# Patient Record
Sex: MALE
[De-identification: names, ages, dates, MRNs, and addresses within clinical notes are randomized per-mention and may not be internally consistent; named-entity substitution may affect disease eponyms.]

---

## 2018-08-20 ENCOUNTER — RX RENEWAL (OUTPATIENT)
Age: 43
End: 2018-08-20

## 2018-08-20 PROBLEM — Z00.00 ENCOUNTER FOR PREVENTIVE HEALTH EXAMINATION: Status: ACTIVE | Noted: 2018-08-20

## 2018-11-12 ENCOUNTER — APPOINTMENT (OUTPATIENT)
Dept: ENDOCRINOLOGY | Facility: CLINIC | Age: 43
End: 2018-11-12

## 2023-05-10 ENCOUNTER — APPOINTMENT (OUTPATIENT)
Dept: DERMATOLOGY | Facility: CLINIC | Age: 48
End: 2023-05-10
Payer: MEDICAID

## 2023-05-10 DIAGNOSIS — L82.1 OTHER SEBORRHEIC KERATOSIS: ICD-10-CM

## 2023-05-10 DIAGNOSIS — D22.5 MELANOCYTIC NEVI OF TRUNK: ICD-10-CM

## 2023-05-10 DIAGNOSIS — L73.8 OTHER SPECIFIED FOLLICULAR DISORDERS: ICD-10-CM

## 2023-05-10 PROCEDURE — 99202 OFFICE O/P NEW SF 15 MIN: CPT

## 2023-05-10 RX ORDER — ATORVASTATIN CALCIUM 80 MG/1
TABLET, FILM COATED ORAL
Refills: 0 | Status: ACTIVE | COMMUNITY

## 2023-05-10 RX ORDER — RAMIPRIL 5 MG/1
CAPSULE ORAL
Refills: 0 | Status: ACTIVE | COMMUNITY

## 2023-05-10 NOTE — HISTORY OF PRESENT ILLNESS
[FreeTextEntry1] : Evaluation of growths [de-identified] : First visit for 47-year-old white male presenting for evaluation of growths.  Particularly concerned about a tender lesion in the right anterior temporal scalp area.\par \par No history of skin cancer.

## 2023-05-10 NOTE — ASSESSMENT
[FreeTextEntry1] : Seborrheic keratosis with concomitant sebaceous hyperplasia on right anterior temporal scalp\par Melanocytic nevi and seborrheic keratoses on trunk\par Traumatized skin tag on left neck

## 2023-05-10 NOTE — PHYSICAL EXAM
[Alert] : alert [Oriented x 3] : ~L oriented x 3 [Well Nourished] : well nourished [FreeTextEntry3] : The following areas were examined and no significant abnormalities were seen except as noted below:\par \par Type II skin\par \par scalp, face, eyelids, nose, lips, ears, neck, chest, abdomen, back,groin, buttocks, right arm, left arm, right hand, left hand,\par right  leg, left leg, right foot, left foot\par \par Right anterior temporal scalp: 17 x 12 mm tan verrucous plaque with a 3 x 3 mm yellow umbilicated papule present at 2:00\par Left neck: 3 x 3 mm protuberant pink excrescence with central crust\par Trunk: Mild to moderate small brown macules\par Mild small brown verrucous papules\par \par No suspicious lesions seen

## 2023-08-21 LAB — HBA1C MFR BLD HPLC: 9.4

## 2023-08-22 ENCOUNTER — APPOINTMENT (OUTPATIENT)
Dept: ENDOCRINOLOGY | Facility: CLINIC | Age: 48
End: 2023-08-22
Payer: MEDICAID

## 2023-08-22 VITALS
SYSTOLIC BLOOD PRESSURE: 125 MMHG | HEART RATE: 95 BPM | OXYGEN SATURATION: 97 % | WEIGHT: 258 LBS | HEIGHT: 70 IN | BODY MASS INDEX: 36.94 KG/M2 | DIASTOLIC BLOOD PRESSURE: 80 MMHG

## 2023-08-22 DIAGNOSIS — E11.9 TYPE 2 DIABETES MELLITUS W/OUT COMPLICATIONS: ICD-10-CM

## 2023-08-22 DIAGNOSIS — Z78.9 OTHER SPECIFIED HEALTH STATUS: ICD-10-CM

## 2023-08-22 DIAGNOSIS — Z80.9 FAMILY HISTORY OF MALIGNANT NEOPLASM, UNSPECIFIED: ICD-10-CM

## 2023-08-22 DIAGNOSIS — Z87.39 PERSONAL HISTORY OF OTHER DISEASES OF THE MUSCULOSKELETAL SYSTEM AND CONNECTIVE TISSUE: ICD-10-CM

## 2023-08-22 DIAGNOSIS — Z82.49 FAMILY HISTORY OF ISCHEMIC HEART DISEASE AND OTHER DISEASES OF THE CIRCULATORY SYSTEM: ICD-10-CM

## 2023-08-22 LAB — GLUCOSE BLDC GLUCOMTR-MCNC: 177

## 2023-08-22 PROCEDURE — 99205 OFFICE O/P NEW HI 60 MIN: CPT | Mod: 25

## 2023-08-22 PROCEDURE — 82962 GLUCOSE BLOOD TEST: CPT

## 2023-08-22 PROCEDURE — 95250 CONT GLUC MNTR PHYS/QHP EQP: CPT | Mod: 1L

## 2023-08-22 NOTE — ASSESSMENT
[FreeTextEntry1] : 47 y.o. Male with obesity and PMHx of DM, HLD, HTN, YENNY, Hx of Renal cancer, s/p partial nephrectomy, self-referred to establish care for DM management. Reportedly seen by Dr. Wilkinson many years ago but relocated. Last endocrinologist was at [facility] ~4 years ago. DM has been managed by PCP. On insulin for the last 5-6 years. Admits poor eating habits and sedentary lifestyle. Owns paperwork business, staying in the office whole day. A1C ranging 9 -10%. Last one 9.4% in 3/2023. Does not check SMBG. Family Hx - father with DM, passed away from MI.    Currently on: Basaglar 70U daily at 6PM Segluramet (SGLT-2i/MFN) 2.5/1000 mg BID Glimepiride 2 mg BID Ozempic 1 mg weekly (for the last 2-3 years)  # Long standing poorly controlled T2D (Dx 10y.ago) No recent labs. Patient will do them next 1-2 weeks for PCP and will send me report Will apply LibrePro to evaluate for the need of insulin redistribution and/or adding premeal insulin LOT [id number], SN [id number], Exp 12/31/2023 Patient will be called for recommendations. Increase Ozempic to 2 mg weekly Continue rest of the regimen Needs dietary input and diabetic education. Will refer to CDE Pt is interested in CGM. Advised to start checking SMBG 3 x day  # Obesity Discussed dietary and lifestyle modification Needs more physical activity Advised walking/running for 30 min at least 3 x week  # HLD, Hypertriglyceridemia Continue statin and Fenofibrate 160 mg daily low fat/cholesterol diet F/u lipids  # HTN Continue Ramipril 2.5 mg daily Follow up in 3 months.

## 2023-08-22 NOTE — PHYSICAL EXAM
[Alert] : alert [Obese] : obese [No Acute Distress] : no acute distress [Well Developed] : well developed [PERRL] : pupils equal, round and reactive to light [Normal Outer Ear/Nose] : the ears and nose were normal in appearance [Normal Hearing] : hearing was normal [Thyroid Not Enlarged] : the thyroid was not enlarged [No Thyroid Nodules] : no palpable thyroid nodules [No Respiratory Distress] : no respiratory distress [Clear to Auscultation] : lungs were clear to auscultation bilaterally [Normal Rate] : heart rate was normal [Regular Rhythm] : with a regular rhythm [No Edema] : no peripheral edema [Soft] : abdomen soft [Normal Supraclavicular Nodes] : no supraclavicular lymphadenopathy [Normal Anterior Cervical Nodes] : no anterior cervical lymphadenopathy [Normal Posterior Cervical Nodes] : no posterior cervical lymphadenopathy [No Clubbing, Cyanosis] : no clubbing  or cyanosis of the fingernails [Normal Strength/Tone] : muscle strength and tone were normal [Acanthosis Nigricans] : no acanthosis nigricans [Normal Reflexes] : deep tendon reflexes were 2+ and symmetric [No Tremors] : no tremors [Oriented x3] : oriented to person, place, and time [Normal Affect] : the affect was normal [Normal Insight/Judgement] : insight and judgment were intact [Normal Mood] : the mood was normal [de-identified] : Obese. Surgical scar from laparotomy.

## 2023-08-22 NOTE — HISTORY OF PRESENT ILLNESS
[FreeTextEntry1] : 47 y.o. Male with obesity and PMHx of DM, HLD, HTN, YENNY, Hx of Renal cancer, s/p partial renalectomy, self-referred to establish care for DM management. Reportedly seen by Dr. Wilkinson many years ago but relocated. Last endocrinologist was at [place] ~4 years ago. DM has been managed by PCP. Admits poor eating habits and sedentary lifestyle. Owns paperwork business, staying in the office whole day. A1C ranging 9 -10%. Last one 9.4% in 3/2023. Family Hx - father with DM, passed away from MI.

## 2023-09-05 ENCOUNTER — APPOINTMENT (OUTPATIENT)
Dept: CARDIOLOGY | Facility: CLINIC | Age: 48
End: 2023-09-05

## 2023-09-12 ENCOUNTER — APPOINTMENT (OUTPATIENT)
Dept: ENDOCRINOLOGY | Facility: CLINIC | Age: 48
End: 2023-09-12

## 2023-10-04 ENCOUNTER — APPOINTMENT (OUTPATIENT)
Dept: ENDOCRINOLOGY | Facility: CLINIC | Age: 48
End: 2023-10-04

## 2023-11-26 ENCOUNTER — RX RENEWAL (OUTPATIENT)
Age: 48
End: 2023-11-26

## 2023-11-28 ENCOUNTER — NON-APPOINTMENT (OUTPATIENT)
Age: 48
End: 2023-11-28

## 2023-11-28 ENCOUNTER — APPOINTMENT (OUTPATIENT)
Dept: CARDIOLOGY | Facility: CLINIC | Age: 48
End: 2023-11-28
Payer: MEDICAID

## 2023-11-28 VITALS
DIASTOLIC BLOOD PRESSURE: 78 MMHG | SYSTOLIC BLOOD PRESSURE: 126 MMHG | BODY MASS INDEX: 37.65 KG/M2 | HEART RATE: 110 BPM | HEIGHT: 70 IN | OXYGEN SATURATION: 98 % | WEIGHT: 263 LBS

## 2023-11-28 PROCEDURE — 99204 OFFICE O/P NEW MOD 45 MIN: CPT | Mod: 25

## 2023-11-28 PROCEDURE — 93000 ELECTROCARDIOGRAM COMPLETE: CPT

## 2023-11-28 RX ORDER — FENOFIBRATE 150 MG/1
CAPSULE ORAL
Refills: 0 | Status: ACTIVE | COMMUNITY

## 2023-11-28 RX ORDER — ATORVASTATIN CALCIUM 80 MG/1
TABLET, FILM COATED ORAL
Refills: 0 | Status: ACTIVE | COMMUNITY

## 2023-12-27 ENCOUNTER — APPOINTMENT (OUTPATIENT)
Dept: ENDOCRINOLOGY | Facility: CLINIC | Age: 48
End: 2023-12-27
Payer: MEDICAID

## 2023-12-27 VITALS
HEIGHT: 70 IN | HEART RATE: 103 BPM | SYSTOLIC BLOOD PRESSURE: 120 MMHG | OXYGEN SATURATION: 97 % | WEIGHT: 263 LBS | BODY MASS INDEX: 37.65 KG/M2 | DIASTOLIC BLOOD PRESSURE: 80 MMHG

## 2023-12-27 LAB — GLUCOSE BLDC GLUCOMTR-MCNC: 231

## 2023-12-27 PROCEDURE — 82962 GLUCOSE BLOOD TEST: CPT

## 2023-12-27 PROCEDURE — 99215 OFFICE O/P EST HI 40 MIN: CPT | Mod: 25

## 2023-12-27 PROCEDURE — G0447 BEHAVIOR COUNSEL OBESITY 15M: CPT | Mod: 59

## 2023-12-29 NOTE — PHYSICAL EXAM
[Alert] : alert [Obese] : obese [No Acute Distress] : no acute distress [Well Developed] : well developed [PERRL] : pupils equal, round and reactive to light [Normal Outer Ear/Nose] : the ears and nose were normal in appearance [Normal Hearing] : hearing was normal [Thyroid Not Enlarged] : the thyroid was not enlarged [No Thyroid Nodules] : no palpable thyroid nodules [No Respiratory Distress] : no respiratory distress [Clear to Auscultation] : lungs were clear to auscultation bilaterally [Normal Rate] : heart rate was normal [Regular Rhythm] : with a regular rhythm [No Edema] : no peripheral edema [Soft] : abdomen soft [Normal Supraclavicular Nodes] : no supraclavicular lymphadenopathy [Normal Anterior Cervical Nodes] : no anterior cervical lymphadenopathy [No Clubbing, Cyanosis] : no clubbing  or cyanosis of the fingernails [Normal Strength/Tone] : muscle strength and tone were normal [Normal Reflexes] : deep tendon reflexes were 2+ and symmetric [No Tremors] : no tremors [Oriented x3] : oriented to person, place, and time [Normal Affect] : the affect was normal [Normal Insight/Judgement] : insight and judgment were intact [Normal Mood] : the mood was normal [Acanthosis Nigricans] : no acanthosis nigricans [de-identified] : Obese. Surgical scar from laparotomy.

## 2023-12-29 NOTE — ASSESSMENT
[Carbohydrate Consistent Diet] : carbohydrate consistent diet [Importance of Diet and Exercise] : importance of diet and exercise to improve glycemic control, achieve weight loss and improve cardiovascular health [Exercise/Effect on Glucose] : exercise/effect on glucose [FreeTextEntry1] : 48 y.o. Male with obesity and PMHx of DM, HLD, HTN, YENNY, Hx of Renal cancer, s/p partial nephrectomy, self-referred to establish care for DM management. Reportedly seen by Dr. Wilkinson many years ago but relocated. Last endocrinologist was at [place] ~4 years ago. DM has been managed by PCP. On insulin for the last 5-6 years. Admits poor eating habits and sedentary lifestyle. Owns paperwork business, staying in the office whole day. A1C ranging 9 -10%. Does not check SMBG. Family Hx - father with DM, passed away from MI.  Currently on: Basaglar 70U daily at 6PM Segluramet (SGLT-2i/MFN) 2.5/1000 mg BID Glimepiride 2 mg BID Ozempic 2 mg weekly (Increased last visit)  # Long standing poorly controlled T2D (Dx 10y.ago) Secondary to dietary indiscretions and sedentary lifestyle. LibrePro in August showed severally elevated BG 24h/day with 74% Very high. Now A1C is 9.8%. Will increase and split Basaglar to 40U BID Start Admelog 10U TIDac Switch Ozempic to Mounjaro 5 mg weekly for 4 weeks and increase to 7.5 mg weekly Continue current Glimepiride and Segluramet Will attach today LibrePro to reevaluate BG range. Patient will drop it back in 2 weeks. LOT [id number], SN [id number], Exp 3/31/24 Missed to do visit with CDE. Needs good dietary input.  Pt is interested in CGM. Advised to start checking SMBG 3 x day  # Obesity Gained 6Lb since last visit even with increasing Ozempic to 2 mg weekly Discussed again dietary and lifestyle modification Needs more physical activity Advised walking/running for 30 min at least 3 x week  # HLD, Severe Hypertriglyceridemia In the setting of severe Hyperglycemia Continue Statin and Fenofibrate 160 mg daily Insist again on low fat/cholesterol diet  # HTN Continue Ramipril 2.5 mg daily  Follow up in 3 months.

## 2023-12-29 NOTE — HISTORY OF PRESENT ILLNESS
[FreeTextEntry1] : 48 y.o. Male with obesity and PMHx of DM, HLD, HTN, YENNY, Hx of Renal cancer, s/p partial renalectomy, self-referred to establish care for DM management. Reportedly seen by Dr. Wilkinson many years ago but relocated. Last endocrinologist was at [facility] ~4 years ago. DM has been managed by PCP. Admits poor eating habits and sedentary lifestyle. Owns paperwork business, staying in the office whole day. A1C ranging 9 -10%. Last one 9.4% in 3/2023. Family Hx - father with DM, passed away from MI.

## 2024-01-11 ENCOUNTER — APPOINTMENT (OUTPATIENT)
Dept: CARDIOLOGY | Facility: CLINIC | Age: 49
End: 2024-01-11
Payer: MEDICAID

## 2024-01-11 PROCEDURE — 93306 TTE W/DOPPLER COMPLETE: CPT

## 2024-01-18 ENCOUNTER — APPOINTMENT (OUTPATIENT)
Dept: CARDIOLOGY | Facility: CLINIC | Age: 49
End: 2024-01-18
Payer: MEDICAID

## 2024-01-18 PROCEDURE — 93880 EXTRACRANIAL BILAT STUDY: CPT | Mod: 26

## 2024-01-30 RX ORDER — SEMAGLUTIDE 2.68 MG/ML
8 INJECTION, SOLUTION SUBCUTANEOUS
Qty: 9 | Refills: 1 | Status: DISCONTINUED | COMMUNITY
Start: 2023-08-22 | End: 2024-01-30

## 2024-01-30 RX ORDER — INSULIN LISPRO 100 [IU]/ML
100 INJECTION, SOLUTION INTRAVENOUS; SUBCUTANEOUS
Qty: 2 | Refills: 1 | Status: ACTIVE | COMMUNITY
Start: 2023-12-27 | End: 1900-01-01

## 2024-03-05 ENCOUNTER — APPOINTMENT (OUTPATIENT)
Dept: CARDIOLOGY | Facility: CLINIC | Age: 49
End: 2024-03-05

## 2024-03-05 ENCOUNTER — RX RENEWAL (OUTPATIENT)
Age: 49
End: 2024-03-05

## 2024-03-17 ENCOUNTER — RX RENEWAL (OUTPATIENT)
Age: 49
End: 2024-03-17

## 2024-03-18 RX ORDER — INSULIN GLARGINE 100 [IU]/ML
100 INJECTION, SOLUTION SUBCUTANEOUS
Qty: 4 | Refills: 1 | Status: ACTIVE | COMMUNITY
Start: 2024-03-18 | End: 1900-01-01

## 2024-03-26 ENCOUNTER — TRANSCRIPTION ENCOUNTER (OUTPATIENT)
Age: 49
End: 2024-03-26

## 2024-03-26 ENCOUNTER — APPOINTMENT (OUTPATIENT)
Dept: ENDOCRINOLOGY | Facility: CLINIC | Age: 49
End: 2024-03-26
Payer: MEDICAID

## 2024-03-26 VITALS
HEART RATE: 89 BPM | OXYGEN SATURATION: 99 % | HEIGHT: 70 IN | BODY MASS INDEX: 37.8 KG/M2 | DIASTOLIC BLOOD PRESSURE: 80 MMHG | WEIGHT: 264 LBS | SYSTOLIC BLOOD PRESSURE: 120 MMHG

## 2024-03-26 DIAGNOSIS — Z79.4 TYPE 2 DIABETES MELLITUS WITH HYPERGLYCEMIA: ICD-10-CM

## 2024-03-26 DIAGNOSIS — E11.65 TYPE 2 DIABETES MELLITUS WITH HYPERGLYCEMIA: ICD-10-CM

## 2024-03-26 DIAGNOSIS — I10 ESSENTIAL (PRIMARY) HYPERTENSION: ICD-10-CM

## 2024-03-26 DIAGNOSIS — E78.5 HYPERLIPIDEMIA, UNSPECIFIED: ICD-10-CM

## 2024-03-26 DIAGNOSIS — E66.9 OBESITY, UNSPECIFIED: ICD-10-CM

## 2024-03-26 LAB — GLUCOSE BLDC GLUCOMTR-MCNC: 204

## 2024-03-26 PROCEDURE — 95251 CONT GLUC MNTR ANALYSIS I&R: CPT

## 2024-03-26 PROCEDURE — 99215 OFFICE O/P EST HI 40 MIN: CPT

## 2024-03-26 PROCEDURE — G2211 COMPLEX E/M VISIT ADD ON: CPT | Mod: NC,1L

## 2024-03-26 PROCEDURE — 82962 GLUCOSE BLOOD TEST: CPT

## 2024-03-26 PROCEDURE — G0447 BEHAVIOR COUNSEL OBESITY 15M: CPT | Mod: 59

## 2024-03-26 PROCEDURE — 99401 PREV MED CNSL INDIV APPRX 15: CPT

## 2024-03-26 RX ORDER — BLOOD SUGAR DIAGNOSTIC
STRIP MISCELLANEOUS
Qty: 400 | Refills: 1 | Status: ACTIVE | COMMUNITY
Start: 2024-01-30 | End: 1900-01-01

## 2024-03-26 RX ORDER — TIRZEPATIDE 7.5 MG/.5ML
7.5 INJECTION, SOLUTION SUBCUTANEOUS
Qty: 3 | Refills: 1 | Status: ACTIVE | COMMUNITY
Start: 2023-12-27 | End: 1900-01-01

## 2024-03-26 RX ORDER — PEN NEEDLE, DIABETIC 32GX 5/32"
32G X 4 MM NEEDLE, DISPOSABLE MISCELLANEOUS
Qty: 3 | Refills: 1 | Status: ACTIVE | COMMUNITY
Start: 2024-01-30 | End: 1900-01-01

## 2024-03-26 RX ORDER — GLIMEPIRIDE 2 MG/1
2 TABLET ORAL
Qty: 180 | Refills: 1 | Status: ACTIVE | OUTPATIENT

## 2024-03-26 RX ORDER — ERTUGLIFLOZIN AND METFORMIN HYDROCHLORIDE 2.5; 1 MG/1; MG/1
2.5-1 TABLET, FILM COATED ORAL
Qty: 180 | Refills: 1 | Status: ACTIVE | COMMUNITY
Start: 2024-01-30 | End: 1900-01-01

## 2024-03-26 RX ORDER — LANCETS 28 GAUGE
EACH MISCELLANEOUS
Qty: 3 | Refills: 1 | Status: ACTIVE | COMMUNITY
Start: 2024-01-30 | End: 1900-01-01

## 2024-03-26 RX ORDER — BLOOD-GLUCOSE METER
70 EACH MISCELLANEOUS
Qty: 3 | Refills: 1 | Status: ACTIVE | COMMUNITY
Start: 2024-01-30 | End: 1900-01-01

## 2024-03-26 NOTE — ASSESSMENT
[Carbohydrate Consistent Diet] : carbohydrate consistent diet [Importance of Diet and Exercise] : importance of diet and exercise to improve glycemic control, achieve weight loss and improve cardiovascular health [Exercise/Effect on Glucose] : exercise/effect on glucose [FreeTextEntry1] : 48 y.o. Male with obesity and PMHx of DM, HLD, HTN, YENNY, Hx of Renal cancer, s/p partial nephrectomy, self-referred to establish care for DM management. Reportedly seen by Dr. Wilkinson many years ago but relocated. Last endocrinologist was at [place] ~4 years ago. DM has been managed by PCP prior to join our service. On insulin for the last 5-6 years. Admits poor eating habits and sedentary lifestyle. Owns paperwork business, staying in the office whole day. A1C ranging 9 -10%. Does not check SMBG.  On the previous visit LibrePro was placed but patient returned just now. Also he was recommended to change Basaglar to 40U BID but he forgot and continue to take current dose. Humalog was started on the last visit but now he is taking only with dinner, explaining that its difficult to him to take it while at work.   [facility] report  from 1/10/24 reviewed: Active time 100%, Target 13%, High 49%, Very High 38%, Low and Very Low 0%  Currently on: Basaglar 70U daily at 8-9 PM Humalog 10 -12 TID (but take only once a day) Segluramet (SGLT-2i/MFN) 2.5/1000 mg BID Glimepiride 2 mg BID Mounjaro 7.5 mg weekly  Assessment/Plan:  # Long standing poorly controlled T2D (Dx 10y.ago) Secondary to non-compliance, dietary indiscretions and sedentary lifestyle. A1C 9.2<==9.8%. Continue current regimen as of now.  Will attach today LibrePro to reevaluate BG range.  Will refer to CDE again - missed previous referrals.  Pt is interested in CGM. Advised to start checking SMBG 3 x day  # Obesity Discussed again dietary and lifestyle modification Needs more physical activity Advised walking/running for 30 min at least 3 x week  # HLD, Severe Hypertriglyceridemia in the past. Continue Statin and Fenofibrate 160 mg daily Managed by PCP  Continue Ramipril 2.5 mg daily for kidney protection.   Due to appointment availability he will follow up with NP in 3 months (labs ordered) F/u with me in 6 moths.  .

## 2024-03-26 NOTE — PHYSICAL EXAM
[Alert] : alert [Obese] : obese [No Acute Distress] : no acute distress [Well Developed] : well developed [PERRL] : pupils equal, round and reactive to light [Normal Hearing] : hearing was normal [Normal Outer Ear/Nose] : the ears and nose were normal in appearance [No Thyroid Nodules] : no palpable thyroid nodules [Thyroid Not Enlarged] : the thyroid was not enlarged [No Respiratory Distress] : no respiratory distress [Clear to Auscultation] : lungs were clear to auscultation bilaterally [Regular Rhythm] : with a regular rhythm [Normal Rate] : heart rate was normal [No Edema] : no peripheral edema [Soft] : abdomen soft [Normal Supraclavicular Nodes] : no supraclavicular lymphadenopathy [Normal Anterior Cervical Nodes] : no anterior cervical lymphadenopathy [No Clubbing, Cyanosis] : no clubbing  or cyanosis of the fingernails [Normal Strength/Tone] : muscle strength and tone were normal [Normal Reflexes] : deep tendon reflexes were 2+ and symmetric [No Tremors] : no tremors [Oriented x3] : oriented to person, place, and time [Normal Affect] : the affect was normal [Normal Insight/Judgement] : insight and judgment were intact [Normal Mood] : the mood was normal [Acanthosis Nigricans] : no acanthosis nigricans [de-identified] : Obese. Surgical scar from laparotomy.

## 2024-03-26 NOTE — HISTORY OF PRESENT ILLNESS
[FreeTextEntry1] : 48 y.o. Male with obesity and PMHx of DM, HLD, HTN, YENNY, Hx of Renal cancer, s/p partial renalectomy, self-referred to establish care for DM management. Reportedly seen by Dr. Wilkinson many years ago but relocated. Last endocrinologist was at [place] ~4 years ago. DM has been managed by PCP. Admits poor eating habits and sedentary lifestyle. Owns paperwork business, staying in the office whole day. A1C ranging 9 -10%. Last one 9.4% in 3/2023. Family Hx - father with DM, passed away from MI.

## 2024-04-04 ENCOUNTER — APPOINTMENT (OUTPATIENT)
Dept: ENDOCRINOLOGY | Facility: CLINIC | Age: 49
End: 2024-04-04

## 2024-05-28 ENCOUNTER — NON-APPOINTMENT (OUTPATIENT)
Age: 49
End: 2024-05-28

## 2024-06-19 ENCOUNTER — APPOINTMENT (OUTPATIENT)
Dept: DERMATOLOGY | Facility: CLINIC | Age: 49
End: 2024-06-19

## 2024-06-21 ENCOUNTER — NON-APPOINTMENT (OUTPATIENT)
Age: 49
End: 2024-06-21

## 2024-07-24 ENCOUNTER — APPOINTMENT (OUTPATIENT)
Dept: ENDOCRINOLOGY | Facility: CLINIC | Age: 49
End: 2024-07-24

## 2024-07-24 NOTE — ASSESSMENT
[FreeTextEntry1] : T2DM -Reviewed risk/complication of uncontrolled DM  -Increase exercise as tolerated 5 days a week x 30 mins/day -Increase dietary efforts, low carb/low sugar -Continue to check FS twice a day and keep log, bring log to next appt -Repeat HgbA1C prior to next appt   Obesity Discussed again dietary and lifestyle modification Needs more physical activity Advised walking/running for 30 min at least 3 x week   HLD, Severe Hypertriglyceridemia in the past. Continue Statin and Fenofibrate 160 mg daily Managed by PCP  Continue Ramipril 2.5 mg daily for kidney protection.

## 2024-07-24 NOTE — HISTORY OF PRESENT ILLNESS
[FreeTextEntry1] : 48 y.o. Male with obesity and PMHx of DM, HLD, HTN, YENNY, Hx of Renal cancer, s/p partial renalectomy, self-referred to establish care for DM management. Reportedly seen by Dr. Wilkinson many years ago but relocated. Last endocrinologist was at [place] ~4 years ago. DM has been managed by PCP. Admits poor eating habits and sedentary lifestyle. Owns paperwork business, staying in the office whole day. A1C ranging 9 -10%. Last one 9.4% in 3/2023. Family Hx - father with DM, passed away from MI.  Quality: Severity: Duration:  Onset: Modifying Factors  SMBG  HgbA1C:   Current Regimen: Basaglar 70U daily at 8-9 PM Humalog 10 -12 TID (but take only once a day) Segluramet (SGLT-2i/MFN) 2.5/1000 mg BID Glimepiride 2 mg BID Mounjaro 7.5 mg weekly  Eye Exam:  Foot Exam: Kidney Disease: Heart Disease:  Weight: Diet: Exercise: Smoking:

## 2024-07-24 NOTE — HISTORY OF PRESENT ILLNESS
[FreeTextEntry1] : 48 y.o. Male with obesity and PMHx of DM, HLD, HTN, YENNY, Hx of Renal cancer, s/p partial renalectomy, self-referred to establish care for DM management. Reportedly seen by Dr. Wilkinson many years ago but relocated. Last endocrinologist was at [place] ~4 years ago. DM has been managed by PCP. Admits poor eating habits and sedentary lifestyle. Owns paperwork business, staying in the office whole day. A1C ranging 9 -10%. Last one 9.4% in 3/2023. Family Hx - father with DM, passed away from MI.  Quality: Severity: Duration:  Onset: Modifying Factors  SMBG  HgbA1C:   Current Regimen: Basaglar 70U daily at 8-9 PM Humalog 10 -12 TID (but take only once a day) Segluramet (SGLT-2i/MFN) 2.5/1000 mg BID Glimepiride 2 mg BID Mounjaro 7.5 mg weekly  Eye Exam:  Foot Exam: Kidney Disease: Heart Disease:  Weight: Diet: Exercise: Smoking:

## 2024-07-28 ENCOUNTER — RX RENEWAL (OUTPATIENT)
Age: 49
End: 2024-07-28

## 2024-08-02 ENCOUNTER — RX RENEWAL (OUTPATIENT)
Age: 49
End: 2024-08-02

## 2024-10-21 LAB
HBA1C MFR BLD HPLC: 9.8
LDLC SERPL DIRECT ASSAY-MCNC: 99
TSH SERPL-ACNC: 1.31

## 2024-11-13 ENCOUNTER — APPOINTMENT (OUTPATIENT)
Dept: ENDOCRINOLOGY | Facility: CLINIC | Age: 49
End: 2024-11-13
Payer: MEDICAID

## 2024-11-13 VITALS
HEIGHT: 70 IN | BODY MASS INDEX: 38.8 KG/M2 | OXYGEN SATURATION: 98 % | WEIGHT: 271 LBS | SYSTOLIC BLOOD PRESSURE: 120 MMHG | DIASTOLIC BLOOD PRESSURE: 82 MMHG | HEART RATE: 102 BPM

## 2024-11-13 DIAGNOSIS — Z79.4 TYPE 2 DIABETES MELLITUS WITH HYPERGLYCEMIA: ICD-10-CM

## 2024-11-13 DIAGNOSIS — E11.65 TYPE 2 DIABETES MELLITUS WITH HYPERGLYCEMIA: ICD-10-CM

## 2024-11-13 DIAGNOSIS — E78.5 HYPERLIPIDEMIA, UNSPECIFIED: ICD-10-CM

## 2024-11-13 LAB — GLUCOSE BLDC GLUCOMTR-MCNC: 260

## 2024-11-13 PROCEDURE — 99215 OFFICE O/P EST HI 40 MIN: CPT

## 2024-11-13 PROCEDURE — G2211 COMPLEX E/M VISIT ADD ON: CPT | Mod: NC

## 2024-11-13 PROCEDURE — G0447 BEHAVIOR COUNSEL OBESITY 15M: CPT | Mod: 59

## 2024-11-13 PROCEDURE — 82962 GLUCOSE BLOOD TEST: CPT

## 2024-11-13 RX ORDER — SEMAGLUTIDE 0.68 MG/ML
2 INJECTION, SOLUTION SUBCUTANEOUS
Qty: 3 | Refills: 1 | Status: ACTIVE | COMMUNITY
Start: 2024-11-13 | End: 1900-01-01

## 2025-01-30 ENCOUNTER — APPOINTMENT (OUTPATIENT)
Dept: ENDOCRINOLOGY | Facility: CLINIC | Age: 50
End: 2025-01-30

## 2025-02-03 ENCOUNTER — RX RENEWAL (OUTPATIENT)
Age: 50
End: 2025-02-03

## 2025-02-03 RX ORDER — PEN NEEDLE, DIABETIC 31 G X1/4"
32G X 4 MM NEEDLE, DISPOSABLE MISCELLANEOUS
Qty: 4 | Refills: 1 | Status: ACTIVE | COMMUNITY
Start: 2025-02-03 | End: 1900-01-01

## 2025-02-11 ENCOUNTER — APPOINTMENT (OUTPATIENT)
Dept: ENDOCRINOLOGY | Facility: CLINIC | Age: 50
End: 2025-02-11

## 2025-03-19 ENCOUNTER — RX RENEWAL (OUTPATIENT)
Age: 50
End: 2025-03-19

## 2025-04-08 ENCOUNTER — TRANSCRIPTION ENCOUNTER (OUTPATIENT)
Age: 50
End: 2025-04-08

## 2025-05-05 ENCOUNTER — RX RENEWAL (OUTPATIENT)
Age: 50
End: 2025-05-05

## 2025-05-08 ENCOUNTER — RX CHANGE (OUTPATIENT)
Age: 50
End: 2025-05-08

## 2025-07-08 ENCOUNTER — RX RENEWAL (OUTPATIENT)
Age: 50
End: 2025-07-08

## 2025-08-18 LAB
HBA1C MFR BLD HPLC: 9.9
LDLC SERPL DIRECT ASSAY-MCNC: 87
TSH SERPL-ACNC: 1.66

## 2025-08-28 ENCOUNTER — RX RENEWAL (OUTPATIENT)
Age: 50
End: 2025-08-28

## 2025-08-28 RX ORDER — INSULIN LISPRO 100 [IU]/ML
100 INJECTION, SOLUTION INTRAVENOUS; SUBCUTANEOUS
Qty: 3 | Refills: 0 | Status: ACTIVE | COMMUNITY
Start: 2025-08-28 | End: 1900-01-01

## 2025-09-06 ENCOUNTER — RX RENEWAL (OUTPATIENT)
Age: 50
End: 2025-09-06